# Patient Record
Sex: FEMALE | Race: WHITE | NOT HISPANIC OR LATINO | Employment: FULL TIME
[De-identification: names, ages, dates, MRNs, and addresses within clinical notes are randomized per-mention and may not be internally consistent; named-entity substitution may affect disease eponyms.]

---

## 2019-09-29 ENCOUNTER — HEALTH MAINTENANCE LETTER (OUTPATIENT)
Age: 59
End: 2019-09-29

## 2021-01-14 ENCOUNTER — HEALTH MAINTENANCE LETTER (OUTPATIENT)
Age: 61
End: 2021-01-14

## 2021-10-24 ENCOUNTER — HEALTH MAINTENANCE LETTER (OUTPATIENT)
Age: 61
End: 2021-10-24

## 2022-02-13 ENCOUNTER — HEALTH MAINTENANCE LETTER (OUTPATIENT)
Age: 62
End: 2022-02-13

## 2022-10-15 ENCOUNTER — HEALTH MAINTENANCE LETTER (OUTPATIENT)
Age: 62
End: 2022-10-15

## 2023-03-26 ENCOUNTER — HEALTH MAINTENANCE LETTER (OUTPATIENT)
Age: 63
End: 2023-03-26

## 2023-10-29 ENCOUNTER — HEALTH MAINTENANCE LETTER (OUTPATIENT)
Age: 63
End: 2023-10-29

## 2024-10-13 ENCOUNTER — HEALTH MAINTENANCE LETTER (OUTPATIENT)
Age: 64
End: 2024-10-13

## 2025-03-27 ENCOUNTER — MEDICAL CORRESPONDENCE (OUTPATIENT)
Dept: HEALTH INFORMATION MANAGEMENT | Facility: CLINIC | Age: 65
End: 2025-03-27
Payer: COMMERCIAL

## 2025-03-27 ENCOUNTER — TRANSCRIBE ORDERS (OUTPATIENT)
Dept: OTHER | Age: 65
End: 2025-03-27

## 2025-03-27 DIAGNOSIS — H49.01 THIRD NERVE PALSY OF RIGHT EYE: Primary | ICD-10-CM

## 2025-04-10 ENCOUNTER — OFFICE VISIT (OUTPATIENT)
Dept: OPHTHALMOLOGY | Facility: CLINIC | Age: 65
End: 2025-04-10
Attending: NEUROLOGICAL SURGERY
Payer: COMMERCIAL

## 2025-04-10 DIAGNOSIS — H49.01 THIRD NERVE PALSY OF RIGHT EYE: Primary | ICD-10-CM

## 2025-04-10 DIAGNOSIS — H50.52 EXOPHORIA: ICD-10-CM

## 2025-04-10 DIAGNOSIS — H50.21 HYPERTROPIA OF RIGHT EYE: ICD-10-CM

## 2025-04-10 PROCEDURE — 92015 DETERMINE REFRACTIVE STATE: CPT

## 2025-04-10 PROCEDURE — 92060 SENSORIMOTOR EXAMINATION: CPT | Performed by: OPHTHALMOLOGY

## 2025-04-10 PROCEDURE — 99214 OFFICE O/P EST MOD 30 MIN: CPT | Performed by: OPHTHALMOLOGY

## 2025-04-10 RX ORDER — ACETAMINOPHEN 325 MG/1
325-650 TABLET ORAL
COMMUNITY
Start: 2025-02-20

## 2025-04-10 RX ORDER — LEVOTHYROXINE SODIUM 88 UG/1
TABLET ORAL
COMMUNITY
Start: 2024-07-19

## 2025-04-10 ASSESSMENT — TONOMETRY
OS_IOP_MMHG: 17
OD_IOP_MMHG: 19
IOP_METHOD: ICARE

## 2025-04-10 ASSESSMENT — EXTERNAL EXAM - LEFT EYE: OS_EXAM: NORMAL

## 2025-04-10 ASSESSMENT — SLIT LAMP EXAM - LIDS
COMMENTS: NORMAL
COMMENTS: NORMAL

## 2025-04-10 ASSESSMENT — CONF VISUAL FIELD
OS_SUPERIOR_NASAL_RESTRICTION: 0
OD_INFERIOR_NASAL_RESTRICTION: 0
METHOD: COUNTING FINGERS
OS_INFERIOR_NASAL_RESTRICTION: 0
OS_NORMAL: 1
OD_SUPERIOR_TEMPORAL_RESTRICTION: 0
OD_INFERIOR_TEMPORAL_RESTRICTION: 0
OS_SUPERIOR_TEMPORAL_RESTRICTION: 0
OS_INFERIOR_TEMPORAL_RESTRICTION: 0
OD_NORMAL: 1
OD_SUPERIOR_NASAL_RESTRICTION: 0

## 2025-04-10 ASSESSMENT — VISUAL ACUITY
OD_PH_SC: 20/20
OD_SC: 20/100
OD_SC+: -1
OS_PH_SC: 20/20
OS_PH_SC+: -1
OD_PH_SC+: -2
METHOD: SNELLEN - LINEAR
OS_SC: 20/25

## 2025-04-10 ASSESSMENT — CUP TO DISC RATIO: OS_RATIO: 0.2

## 2025-04-10 ASSESSMENT — REFRACTION_MANIFEST
OS_CYLINDER: +1.00
OD_CYLINDER: SPHERE
OS_AXIS: 165
OS_ADD: +2.50
OS_SPHERE: -0.25
OD_ADD: +2.50
OD_SPHERE: -2.25

## 2025-04-10 ASSESSMENT — EXTERNAL EXAM - RIGHT EYE: OD_EXAM: NORMAL

## 2025-04-10 NOTE — NURSING NOTE
Chief Complaint(s) and History of Present Illness(es)       Diplopia Evaluation    In right eye.  Associated symptoms include eye pain.  Negative for droopy eyelid and blurred vision.             Comments    Micki Cobos is a 64 year old female sent for consultation  for right CN 3 palsy.  Pt with hx of right pterional craniotomy for resection of meningioma on 2/17/2025.  Micki reports right brow numbness and tenderness from her craniotomy.  Hx of lasik sx 15 years ago, monovision.  She reports constant double vision with both eyes opened and only in down-gaze.  Random eye pulling but no eye pain.  She has noticed some distortion in her vision when going from bright to dim rooms.     TENNILLE Gordon 4/10/2025 11:03 AM

## 2025-04-10 NOTE — PROGRESS NOTES
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 Exophoria    3. Hypertropia of right eye         Patient was sent for consultation by neurosurgeon Dr. Whyte for cranial nerve third palsy.       HPI:  Micki is a 64 year old F with history of meningioma s/p right pterional craniotomy 2025. Patient did well post-operatively expect for partial cranial nerve III palsy with diplopia and ptosis.      Per chart review, she saw Dr. Whyte 3/27/25 who noted she had WHO grade 1 meningioma. She is recovering well and will follow up with them in 3 months and will repeat her MRI at 6 months.     Patient notes that after the surgery and now still has some residual effects of CN III palsy. She does feel like the eyelid was previously droopier but has now improved. Previously would close on its own throughout the day, but now it stays open until the end of the day. She at this point only has diplopia when looking down or looking down and left. The diplopia has been a stable amount since the surgery.     She also has noted having silver shimmering in the temporal or full peripheral vision that has episodically occurred for the past 8-10 years. Episodes last at most 15 minutes and does not have any associated symptoms such as headaches or nausea. The visual phenomena is in the same spot in both eyes when looking with each individually, and even there is she closes her eye.     She also has history of LASIK with monovision around .       Independent historians:  Patient and     Review of outside testin2025 MR Brain   Impression    1. Intraoperative MRI demonstrates interval gross total resection of enhancing dural based neoplasm along the right sphenoid wing.  2. No concerning diffusion imaging abnormalities.    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 Whyte        Past medical history:  Meningioma s/p resection  Levothyroxine - hypothyroidism    Medications:   Current Outpatient Medications   Medication Sig Dispense Refill    acetaminophen (TYLENOL) 325 MG tablet Take 325-650 mg by mouth.      levothyroxine (SYNTHROID/LEVOTHROID) 88 MCG tablet       NO ACTIVE MEDICATIONS      Levothyroxine 88 mcg    Family history:  Patient's family history includes Alcohol/Drug in her brother; Breast Cancer in her maternal grandmother; C.A.D. in her father and mother; Cerebrovascular Disease in her maternal grandfather; Diabetes in her brother, father, and maternal grandmother; Psychotic Disorder in her brother; Thyroid Disease in her brother.     Sister - breast cancer    Maternal grandmother - glaucoma    Social history:  Patient  reports that she quit smoking about 18 years ago. She has never used smokeless tobacco. She reports current alcohol use. She reports that she does not use drugs. 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  Color vision 11/11 right eye and 11/11 left eye.  Pupils no APD with sluggish right pupil. Intraocular pressure 19 right eye and 17 left eye.  Anterior segment exam within normal.  Fundus exam with peripheral lattice degeneration.      Remaining cranial nerve exam (V, VII-XII): V1-V3 sensation intact bilaterally, although some tingling in right V1 and numbness along right eyebrow. Muscles of facial expression symmetric and no facial droop. Hearing intact to conversational tone. Palate elevates symmetrically. Tongue protrudes midline and side-to-side. Shoulder elevation and sternocleidomastoid strength intact.     Tests ordered and interpreted today:  Sensorimotor          Performed by: ny   . Patient cooperation: Reliable   . Reliability of the test: Good   . Test Findings: Strabismus   .  Interpretation: Exotropia, Hypertropia, 3rd nerve palsy, Noncomitant strabismus   . Plan: Will monitor and consider eye muscle surgery   . Interval: Initial   .        Sensorimotor     Discussion of management / interpretation with another provider:   None    Assessment/Plan:   It is my impression that patient has right CNIII palsy that developed after undergoing resection of meningioma on 2/17/25. Initial ptosis symptoms have improved and she currently has small degree of ptosis on exam (1mm difference compared to left). She does have limitation of downgaze with a RHT in downgaze and symptoms of diplopia in downgaze. This may resolve with time after meningioma resection.  I would hold off on giving her a prism in downgaze because this will make it difficult for her in primary gaze.  I would give her a pair of plain glasses and place a piece of tape on the lower half of the left lens so that she can use her right eye to read and her left eye for distance with the plain glasses.  We discussed that the timeline for improvement of 3rd nerve palsy that develops in the setting of neurosurgery would be on the order of 1 year.  I will plan on seeing her again in 4 months sooner as needed for worsening symptoms.            Attending Physician Attestation:  Complete documentation of historical and exam elements from today's encounter can be found in the full encounter summary report (not reduplicated in this progress note).  I personally obtained the chief complaint(s) and history of present illness.  I confirmed and edited as necessary the review of systems, past medical/surgical history, family history, social history, and examination findings as documented by others; and I examined the patient myself.  I personally reviewed the relevant tests, images, and reports as documented above.  I formulated and edited as necessary the assessment and plan and discussed the findings and management plan with the patient and family. I  personally reviewed the ophthalmic test(s) associated with this encounter, agree with the interpretation(s) as documented by the resident/fellow, and have edited the corresponding report(s) as necessary.  - Aston Bray MD      Precharting:  Alina Martinez MD  PGY-3 Ophthalmology Resident  [facility]

## 2025-04-10 NOTE — LETTER
April 10, 2025     RE:  :  MRN: Micki Cobos  1960  [id number]     Dear Dr. Whyte:    Thank you for asking me to see your very pleasant patient,Micki Cobos, in neuro-ophthalmic consultation.  I would like to thank you for sending your records and I have summarized them in the history of present illness.   My assessment and plan are below.  For further details, please see my attached clinic note.      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 Exophoria    3. Hypertropia of right eye       Patient was sent for consultation by neurosurgeon Dr. Whyte for cranial nerve third palsy.     HPI:  Micki is a 64 year old F with history of meningioma s/p right pterional craniotomy 2025. Patient did well post-operatively expect for partial cranial nerve III palsy with diplopia and ptosis.      Per chart review, she saw Dr. Whyte 3/27/25 who noted she had WHO grade 1 meningioma. She is recovering well and will follow up with them in 3 months and will repeat her MRI at 6 months.     Patient notes that after the surgery and now still has some residual effects of CN III palsy. She does feel like the eyelid was previously droopier but has now improved. Previously would close on its own throughout the day, but now it stays open until the end of the day. She at this point only has diplopia when looking down or looking down and left. The diplopia has been a stable amount since the surgery.     She also has noted having silver shimmering in the temporal or full peripheral vision that has episodically occurred for the past 8-10 years. Episodes last at most 15 minutes and does not have any associated symptoms such as headaches or nausea. The visual phenomena is in the same spot in both eyes when looking with each individually, and even there is she closes her eye.     She also has history of LASIK with monovision around .      Independent historians:  Patient and     Review of outside [id number] MR Brain   Impression    1. Intraoperative MRI demonstrates interval gross total resection of enhancing dural based neoplasm along the right sphenoid wing.  2. No concerning diffusion imaging abnormalities.    My interpretation performed today of outside testing:  There is a large right sphenoid wing meningioma    Review of outside clinical notes:  3/6/2025 Neurosurgery Appointment with Dr. Whyte      Past medical history:  Meningioma s/p resection  Levothyroxine - hypothyroidism    Medications:   Current Outpatient Medications   Medication Sig Dispense Refill     acetaminophen (TYLENOL) 325 MG tablet Take 325-650 mg by mouth.       levothyroxine (SYNTHROID/LEVOTHROID) 88 MCG tablet        NO ACTIVE MEDICATIONS      Levothyroxine 88 mcg    Family history:  Patient's family history includes Alcohol/Drug in her brother; Breast Cancer in her maternal grandmother; C.A.D. in her father and mother; Cerebrovascular Disease in her maternal grandfather; Diabetes in her brother, father, and maternal grandmother; Psychotic Disorder in her brother; Thyroid Disease in her brother.   Sister - breast cancer    Maternal grandmother - glaucoma    Social history: Patient  reports that she quit smoking about 18 years ago. She has never used smokeless tobacco. She reports current alcohol use. She reports that she does not use drugs. 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  Color vision 11/11 right eye and 11/11 left eye.  Pupils no APD with sluggish right pupil. Intraocular pressure 19 right eye and 17 left eye.  Anterior segment exam within normal.  Fundus exam with peripheral lattice degeneration.      Remaining cranial nerve exam (V, VII-XII): V1-V3 sensation intact bilaterally, although some tingling in right V1 and numbness along right eyebrow.  Muscles of facial expression symmetric and no facial droop. Hearing intact to conversational tone. Palate elevates symmetrically. Tongue protrudes midline and side-to-side. Shoulder elevation and sternocleidomastoid strength intact.     Tests ordered and interpreted today:  Sensorimotor    Performed by: ny   . Patient cooperation: Reliable   . Reliability of the test: Good   . Test Findings: Strabismus   . Interpretation: Exotropia, Hypertropia, 3rd nerve palsy, Noncomitant strabismus   . Plan: Will monitor and consider eye muscle surgery   . Interval: Initial     Sensorimotor     Discussion of management / interpretation with another provider:   None    Assessment/Plan:   It is my impression that patient has right CNIII palsy that developed after undergoing resection of meningioma on 2/17/25. Initial ptosis symptoms have improved and she currently has small degree of ptosis on exam (1mm difference compared to left). She does have limitation of downgaze with a RHT in downgaze and symptoms of diplopia in downgaze. This may resolve with time after meningioma resection.  I would hold off on giving her a prism in downgaze because this will make it difficult for her in primary gaze.  I would give her a pair of plain glasses and place a piece of tape on the lower half of the left lens so that she can use her right eye to read and her left eye for distance with the plain glasses.  We discussed that the timeline for improvement of 3rd nerve palsy that develops in the setting of neurosurgery would be on the order of 1 year.  I will plan on seeing her again in 4 months sooner as needed for worsening symptoms.    Again, thank you for allowing me to participate in the care of your patient.      Sincerely,    Aston Bray MD  Professor  Director of Neuro-Ophthalmology  [facility] Endowed Chair  Departments of Ophthalmology, Neurology, and Neurosurgery  [facility] [address] [facility],  [place]  [address]  T - [phone]  F - [phone]  [email or web address]      CC: Aman Whyte MD  [id number] [address] [address]  Via Fax: [phone]    DX = 3rd nerve palsy, sphenoid wing meningioma